# Patient Record
Sex: FEMALE | Race: WHITE | NOT HISPANIC OR LATINO | URBAN - METROPOLITAN AREA
[De-identification: names, ages, dates, MRNs, and addresses within clinical notes are randomized per-mention and may not be internally consistent; named-entity substitution may affect disease eponyms.]

---

## 2018-12-27 ENCOUNTER — OFFICE VISIT - HEALTHEAST (OUTPATIENT)
Dept: FAMILY MEDICINE | Facility: CLINIC | Age: 23
End: 2018-12-27

## 2018-12-27 DIAGNOSIS — Z71.84 TRAVEL ADVICE ENCOUNTER: ICD-10-CM

## 2018-12-27 DIAGNOSIS — Z23 ENCOUNTER FOR IMMUNIZATION: ICD-10-CM

## 2018-12-27 RX ORDER — TRAZODONE HYDROCHLORIDE 150 MG/1
TABLET ORAL
Refills: 2 | Status: SHIPPED | COMMUNITY
Start: 2018-12-24

## 2018-12-27 RX ORDER — ATOVAQUONE AND PROGUANIL HYDROCHLORIDE 250; 100 MG/1; MG/1
1 TABLET, FILM COATED ORAL
Qty: 30 TABLET | Refills: 0 | Status: SHIPPED | OUTPATIENT
Start: 2018-12-27

## 2018-12-27 ASSESSMENT — MIFFLIN-ST. JEOR: SCORE: 1893.72

## 2021-06-02 VITALS — HEIGHT: 69 IN | BODY MASS INDEX: 35.34 KG/M2 | WEIGHT: 238.6 LBS

## 2021-06-22 NOTE — PROGRESS NOTES
Family Medicine Office Visit  [facility]  Patient Name: Jaky Casey  Patient Age: 23 y.o.  YOB: 1995  MRN: [id number]    Date of Visit: 2018  Reason for Office Visit:   Chief Complaint   Patient presents with     Travel Consult     [facility] for 3 weeks leaves [place]           Assessment / Plan / Medical Decision Makin. Travel advice encounter  Given typhoid and Tdap.  Discussed routine prevention methods and given information from the CDC    2. Encounter for immunization  Immunizations given        Health Maintenance Review  Health Maintenance   Topic Date Due     HPV VACCINES (1 - Female 3-dose series) 10/13/2008     CHLAMYDIA SCREENING  10/13/2012     ADVANCE DIRECTIVES DISCUSSED WITH PATIENT  10/13/2015     INFLUENZA VACCINE RULE BASED (1) 2018     PAP SMEAR  10/13/2018     TD 18+ HE  2028     TDAP ADULT ONE TIME DOSE  Completed         I am having Jaky Casey start on atovaquone-proguanil. I am also having her maintain her traZODone.      HPI:  Jaky Casey is a 23 y.o. year old who presents to the office today for planning on school work in [facility] - doing a project on human trafficking and going to 3 different cities in [facility] including [place].  Planning on being there for 3 weeks.  Pt reports up to date on all immunizations including hepatitis A and B but no records available.        Review of Systems- pertinent positive in bold:  Constitutional: Fever, chills, night sweats, fainting, weight change, fatigue, seizures, dizziness, sleeping difficulties, loud snoring/pauses in breathing  Eyes: change in vision, blurred or double vision, redness/eye pain  Ears, nose, mouth, throat: change in hearing, ear pain, hoarseness, difficulty swallowing, sores in the mouth or throat  Respiratory: shortness of breath, cough, bloody sputum, wheezing  Cardiovascular: chest pain, palpitations   Gastrointestinal: abdominal  "pain, heartburn/indigestion, nausea/vomiting, change in appetite, change in bowel habits, constipation or diarrhea, rectal bleeding/dark stools, difficulty swallowing  Urinary: painful urination, frequent urination, urinary urgency/incontinence, blood in urine/dark urine, nocturia  Musculoskeletal: backache/back pain (new or increasing), weakness, joint pain/stiffness (new or increasing), muscle cramps, swelling of hands, feet, ankles, leg pain/redness  Skin: change in moles/freckles, rash, nodules  Hematologic/lymphatic: swollen lymph glands, abnormal bruising/bleeding  Endocrine: excessive thirst/urination, cold or heat intolerance  Neurologic/emotional: worrisome memory change, numbness/tingling, anxiety, mood swings      Current Scheduled Meds:  Outpatient Encounter Medications as of 12/27/2018   Medication Sig Dispense Refill     atovaquone-proguanil (MALARONE) 250-100 mg Tab Take 1 tablet by mouth daily with breakfast. 30 tablet 0     traZODone (DESYREL) 150 MG tablet TAKE ONE TABLET BY MOUTH ONE TIME DAILY AT BEDTIME AS NEEDED  2     No facility-administered encounter medications on file as of 12/27/2018.      History reviewed. No pertinent past medical history.  No past surgical history on file.  Social History     Tobacco Use     Smoking status: Former Smoker     Smokeless tobacco: Never Used   Substance Use Topics     Alcohol use: Not on file     Drug use: Not on file       Objective / Physical Examination:  Vitals:    12/27/18 0906   BP: 108/60   Pulse: 85   SpO2: 97%   Weight: (!) 238 lb 9.6 oz (108.2 kg)   Height: 5' 8.5\" (1.74 m)     Wt Readings from Last 3 Encounters:   12/27/18 (!) 238 lb 9.6 oz (108.2 kg)     BP Readings from Last 3 Encounters:   12/27/18 108/60     Body mass index is 35.75 kg/m .     General Appearance: Alert and oriented, cooperative, affect appropriate, speech clear, in no apparent distress  Lungs: Clear to auscultation bilaterally. Normal inspiratory and expiratory " effort  Cardiovascular: Regular rate, normal S1, S2. No murmurs, rubs, or gallops  Abdomen: Bowel sounds active all four quadrants. Soft, non-tender. No hepatomegaly or splenomegaly. No bruits detected.   Extremities: Pulses 2+ and equal throughout. No edema. Strength equal throughout.  Integumentary: Warm and dry. Without suspicious looking lesions  Neuro: Alert and oriented, follows commands appropriately.     Orders Placed This Encounter   Procedures     Tdap vaccine,  8yo or older,  IM     Typhoid, Inactive, Inj   Followup: No Follow-up on file. earlier if needed.    Total time spent with patient was 15 min with >50% of time spent in face-to-face counseling regarding the above plan       Maite Washington MD